# Patient Record
Sex: FEMALE | Race: WHITE
[De-identification: names, ages, dates, MRNs, and addresses within clinical notes are randomized per-mention and may not be internally consistent; named-entity substitution may affect disease eponyms.]

---

## 2019-01-02 ENCOUNTER — HOSPITAL ENCOUNTER (OUTPATIENT)
Dept: HOSPITAL 92 - BICRAD | Age: 24
Discharge: HOME | End: 2019-01-02
Attending: FAMILY MEDICINE
Payer: COMMERCIAL

## 2019-01-02 DIAGNOSIS — M54.2: Primary | ICD-10-CM

## 2019-01-02 DIAGNOSIS — M54.6: ICD-10-CM

## 2019-01-02 PROCEDURE — 36415 COLL VENOUS BLD VENIPUNCTURE: CPT

## 2019-01-02 PROCEDURE — 80061 LIPID PANEL: CPT

## 2019-01-02 PROCEDURE — 81001 URINALYSIS AUTO W/SCOPE: CPT

## 2019-01-02 PROCEDURE — 80053 COMPREHEN METABOLIC PANEL: CPT

## 2019-01-02 PROCEDURE — 85025 COMPLETE CBC W/AUTO DIFF WBC: CPT

## 2019-01-02 PROCEDURE — 72072 X-RAY EXAM THORAC SPINE 3VWS: CPT

## 2019-01-02 PROCEDURE — 84443 ASSAY THYROID STIM HORMONE: CPT

## 2019-01-02 PROCEDURE — 72052 X-RAY EXAM NECK SPINE 6/>VWS: CPT
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
## 2019-01-25 ENCOUNTER — HOSPITAL ENCOUNTER (OUTPATIENT)
Dept: HOSPITAL 92 - BICMRI | Age: 24
Discharge: HOME | End: 2019-01-25
Attending: FAMILY MEDICINE
Payer: COMMERCIAL

## 2019-01-25 DIAGNOSIS — M50.122: Primary | ICD-10-CM

## 2019-01-25 DIAGNOSIS — M43.9: ICD-10-CM

## 2019-01-25 DIAGNOSIS — M54.14: ICD-10-CM

## 2019-01-25 PROCEDURE — 72141 MRI NECK SPINE W/O DYE: CPT

## 2019-01-25 PROCEDURE — 72146 MRI CHEST SPINE W/O DYE: CPT

## 2019-01-25 NOTE — MRI
MRI CERVICAL SPINE NONCONTRAST:

 

Date:  01/25/19 

 

HISTORY:  

23-year-old female with cervical radiculopathy, M54.12. 

Cervicalgia that radiates to left upper extremity, with intermittent hypesthesia and paresthesia. 

 

FINDINGS:

No Chiari I malformation. Alignment is normal. Cervical spine cord is normal in size and signal. No s
yrinx. Bone marrow signal is normal. No high grade disc space narrowing at any level. No high grade f
acet DJD at any level. At C5-6, there is a very small left paracentral disc herniation which abuts th
e left paramedian ventral aspect of the spinal cord, without causing central spinal canal stenosis. T
here is no central spinal canal stenosis at this level or any other level. No neural foraminal stenos
is at any level. No nerve root impingement at any level. Perivertebral spaces are unremarkable. 

 

IMPRESSION: 

1.  At C5-C6, there is a very small left paracentral disc herniation abutting the ventral aspect of t
he spinal cord. 

 

2.  Otherwise normal. 

 

 

POS: [facility]

## 2019-01-25 NOTE — MRI
MRI THORACIC SPINE NONCONTRAST:

 

DATE: 1/25/2019.

 

HISTORY: 

A 23-year-old female with M54.14, thoracic radiculopathy.

 

Mid back pain that radiates to left upper extremity, with paresthesia and hypesthesia.

 

FINDINGS: 

The thoracic spinal cord is normal in size and signal.  No syrinx.  The vertebral body heights are ma
intained.  Bone marrow signal is normal.  The disk spaces are maintained, with normal disk signal.  N
o focal disk herniation identified.  No central stenosis or neural foraminal stenosis at any level.  
No cord impingement or nerve root impingement at any level.  There is a minimal lateral curvature, co
nvex right.  Perivertebral spaces are unremarkable.  

 

IMPRESSION: 

1.  Minimal lateral curvature.

2.  Otherwise, normal.

 

POS: [facility]